# Patient Record
Sex: FEMALE | Race: OTHER | HISPANIC OR LATINO | Employment: UNEMPLOYED | URBAN - METROPOLITAN AREA
[De-identification: names, ages, dates, MRNs, and addresses within clinical notes are randomized per-mention and may not be internally consistent; named-entity substitution may affect disease eponyms.]

---

## 2018-10-05 ENCOUNTER — APPOINTMENT (EMERGENCY)
Dept: CT IMAGING | Facility: HOSPITAL | Age: 63
End: 2018-10-05
Payer: COMMERCIAL

## 2018-10-05 ENCOUNTER — HOSPITAL ENCOUNTER (EMERGENCY)
Facility: HOSPITAL | Age: 63
Discharge: HOME | End: 2018-10-06
Attending: EMERGENCY MEDICINE | Admitting: EMERGENCY MEDICINE
Payer: COMMERCIAL

## 2018-10-05 DIAGNOSIS — M54.9 MUSCULOSKELETAL BACK PAIN: ICD-10-CM

## 2018-10-05 DIAGNOSIS — R07.89 MUSCULOSKELETAL CHEST PAIN: ICD-10-CM
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
## 2018-10-05 PROCEDURE — 71260 CT THORAX DX C+: CPT

## 2018-10-05 PROCEDURE — 96361 HYDRATE IV INFUSION ADD-ON: CPT

## 2018-10-05 PROCEDURE — 85025 COMPLETE CBC W/AUTO DIFF WBC: CPT | Performed by: EMERGENCY MEDICINE

## 2018-10-05 PROCEDURE — 99284 EMERGENCY DEPT VISIT MOD MDM: CPT

## 2018-10-05 PROCEDURE — 80048 BASIC METABOLIC PNL TOTAL CA: CPT | Performed by: EMERGENCY MEDICINE

## 2018-10-05 PROCEDURE — 36415 COLL VENOUS BLD VENIPUNCTURE: CPT | Performed by: EMERGENCY MEDICINE

## 2018-10-05 PROCEDURE — 93005 ELECTROCARDIOGRAM TRACING: CPT

## 2018-10-05 RX ORDER — SODIUM CHLORIDE 9 MG/ML
125 INJECTION, SOLUTION INTRAVENOUS CONTINUOUS
Status: DISCONTINUED | OUTPATIENT
Start: 2018-10-05 | End: 2018-10-06 | Stop reason: HOSPADM

## 2018-10-05 RX ADMIN — SODIUM CHLORIDE 125 ML/HR: 9 INJECTION, SOLUTION INTRAVENOUS at 23:57

## 2018-10-06 VITALS
HEART RATE: 64 BPM | WEIGHT: 170.86 LBS | TEMPERATURE: 97 F | RESPIRATION RATE: 18 BRPM | HEIGHT: 62 IN | SYSTOLIC BLOOD PRESSURE: 115 MMHG | DIASTOLIC BLOOD PRESSURE: 82 MMHG | BODY MASS INDEX: 31.44 KG/M2 | OXYGEN SATURATION: 100 %

## 2018-10-06 LAB
ANION GAP SERPL CALCULATED.3IONS-SCNC: 10 MMOL/L (ref 5–14)
ATRIAL RATE: 85 BPM
BASOPHILS # BLD AUTO: 0.2 THOUSANDS/ΜL (ref 0–0.1)
BASOPHILS NFR BLD AUTO: 2 % (ref 0–1)
BUN SERPL-MCNC: 20 MG/DL (ref 5–25)
CALCIUM SERPL-MCNC: 9.7 MG/DL (ref 8.4–10.2)
CHLORIDE SERPL-SCNC: 102 MMOL/L (ref 97–108)
CO2 SERPL-SCNC: 27 MMOL/L (ref 22–30)
CREAT SERPL-MCNC: 0.98 MG/DL (ref 0.6–1.2)
EOSINOPHIL # BLD AUTO: 0 THOUSAND/ΜL (ref 0–0.4)
EOSINOPHIL NFR BLD AUTO: 0 % (ref 0–6)
ERYTHROCYTE [DISTWIDTH] IN BLOOD BY AUTOMATED COUNT: 13.5 %
GFR SERPL CREATININE-BSD FRML MDRD: 61 ML/MIN/1.73SQ M
GLUCOSE SERPL-MCNC: 131 MG/DL (ref 70–99)
HCT VFR BLD AUTO: 41.6 % (ref 36–46)
HGB BLD-MCNC: 14.3 G/DL (ref 12–16)
LYMPHOCYTES # BLD AUTO: 0.6 THOUSANDS/ΜL (ref 0.5–4)
LYMPHOCYTES NFR BLD AUTO: 7 % (ref 20–50)
MCH RBC QN AUTO: 31.5 PG (ref 26–34)
MCHC RBC AUTO-ENTMCNC: 34.4 G/DL (ref 31–36)
MCV RBC AUTO: 92 FL (ref 80–100)
MONOCYTES # BLD AUTO: 0.5 THOUSAND/ΜL (ref 0.2–0.9)
MONOCYTES NFR BLD AUTO: 6 % (ref 1–10)
NEUTROPHILS # BLD AUTO: 6.9 THOUSANDS/ΜL (ref 1.8–7.8)
NEUTS SEG NFR BLD AUTO: 85 % (ref 45–65)
P AXIS: 41 DEGREES
PLATELET # BLD AUTO: 149 THOUSANDS/UL (ref 150–450)
PMV BLD AUTO: 10.5 FL (ref 8.9–12.7)
POTASSIUM SERPL-SCNC: 4.1 MMOL/L (ref 3.6–5)
PR INTERVAL: 172 MS
QRS AXIS: -8 DEGREES
QRSD INTERVAL: 90 MS
QT INTERVAL: 362 MS
QTC INTERVAL: 430 MS
RBC # BLD AUTO: 4.54 MILLION/UL (ref 4–5.2)
SODIUM SERPL-SCNC: 139 MMOL/L (ref 137–147)
T WAVE AXIS: 15 DEGREES
VENTRICULAR RATE: 85 BPM
WBC # BLD AUTO: 8.1 THOUSAND/UL (ref 4.5–11)

## 2018-10-06 PROCEDURE — 96374 THER/PROPH/DIAG INJ IV PUSH: CPT

## 2018-10-06 PROCEDURE — 93010 ELECTROCARDIOGRAM REPORT: CPT | Performed by: INTERNAL MEDICINE

## 2018-10-06 PROCEDURE — 96361 HYDRATE IV INFUSION ADD-ON: CPT

## 2018-10-06 RX ORDER — CYCLOBENZAPRINE HCL 10 MG
10 TABLET ORAL 2 TIMES DAILY PRN
Qty: 20 TABLET | Refills: 0 | Status: SHIPPED | OUTPATIENT
Start: 2018-10-06

## 2018-10-06 RX ORDER — NAPROXEN 500 MG/1
500 TABLET ORAL 2 TIMES DAILY WITH MEALS
Qty: 30 TABLET | Refills: 0 | Status: SHIPPED | OUTPATIENT
Start: 2018-10-06

## 2018-10-06 RX ADMIN — IOHEXOL 85 ML: 350 INJECTION, SOLUTION INTRAVENOUS at 01:31

## 2018-10-06 RX ADMIN — MORPHINE SULFATE 2 MG: 2 INJECTION, SOLUTION INTRAMUSCULAR; INTRAVENOUS at 00:04
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
## 2018-10-06 NOTE — DISCHARGE INSTRUCTIONS
Accidente automovilístico   LO QUE NECESITA SABER:   Los accidentes automovilísticos pueden causar lesiones ocasionadas por el impacto o por john sido movido de un lado al otro dentro del salima  Podría tener un hematoma en el abdomen, pecho o kyra debido al cinturón de seguridad  También puede que tenga dolor en kaye jennifer, kyra o espalda  Podría sentir dolor en las rodillas, caderas o muslos si kaye cuerpo golpea el tablero o el volante  El dolor muscular tiende a empeorar de 1 a 2 días después del accidente  INSTRUCCIONES SOBRE [facility]:   [place] al 911 si presenta:   · Usted tiene un nuevo dolor de pecho o éste [facility], o tiene falta de [place]  Regrese a la giovani de emergencias si:   · Usted tiene un dolor nuevo o peor en el abdomen  · Usted tiene náuseas y vómitos que no mejoran  · Usted tiene un ernestine dolor de santiago  · Usted tiene debilidad, hormigueo o adormecimiento en bella brazos o piernas  · Usted tiene un dolor nuevo o peor que le dificulta el movimiento  Pregúntele a kaye Leamon Handsome vitaminas y minerales son adecuados para usted  · Usted tiene dolor que aparece de 2 a 3 días después del accidente  · Usted tiene preguntas o inquietudes acerca de kaye condición o cuidado  Medicamentos:   · Analgésicos:  Usted podría recibir medicamento para quitarle o reducir el dolor  No espere a que el dolor sea muy intenso para may el medicamento  · AINEs (Analgésicos antiinflamatorios no esteroides) ruben el ibuprofeno, ayudan a disminuir la inflamación, el dolor y la [place]  Juliana medicamento esta disponible con o sin erin receta médica  Los AINEs pueden causar sangrado estomacal o problemas renales en ciertas personas  Si usted esta tomando un anticoágulante,  siempre  pregunte si los AINEs son seguros para usted  Siempre olman la etiqueta de juliana medicamento y [place] instrucciones   No administre juliana medicamento a niños menores de 6 meses de maliha sin antes obtener la autorización de kaye médico      · [place] bella medicamentos ruben se le haya indicado  Consulte con kaye médico si usted ray que kaye medicamento no le está ayudando o si presenta efectos secundarios  Infórmele si es alérgico a algún medicamento  Mantenga erin lista actualizada de los [place], las vitaminas y los productos herbales que [place]  Incluya los siguientes datos de los medicamentos: cantidad, frecuencia y motivo de administración  Traiga con usted la lista o los envases de la píldoras a bella citas de seguimiento  Lleve la lista de los medicamentos con usted en johnny de erin emergencia  Acuda a bella consultas de control con kaye médico según le indicaron  Anote bella preguntas para que se acuerde de hacerlas franicsco bella visitas  Consejos de seguridad:   · Use siempre kaye cinturón de seguridad  [place] de kaye cinturón de seguridad ayudará a reducir las lesiones sufridas por accidentes automovilísticos  · Use asientos de seguridad para niños  Es necesario que kaye chente se siente en un asiento de seguridad para niños hecho para kaye edad, altura, y [place]  Pregúntele a kaye médico sobre [address] acerca de los asientos de seguridad para niños  · [place] velocidad  Maneje kaye salima al límite de velocidad para reducir kaye riesgo de accidentes automovilísticos  · No maneje si se siente cansado  Usted reacciona más lentamente cuando está cansado  El tiempo de reacción lento aumentará el riesgo de un accidente automovilístico      · No hable por teléfono ni envíe mensajes de texto [employer]  Usted no reaccionará lo suficientemente rápido en erin emergencia si se distrae con mensajes de texto o conversaciones  · No mitesh y [facility]  Use un chofer designado  Llame un taxi o pídale a alguien que lo lleve a casa si ha estado bebiendo alcohol  No permita que bella amigos manejen si whelan estado bebiendo alcohol  · No consuma drogas ilegales y [facility]    Es probable que se sienta más cansado o tome riesgos que usualmente no tomaría  No maneje después de may medicamentos prescritos que le dan sueño  Cuidados personales:   · Use hielo y calor  El hielo ayuda a disminuir la inflamación y el dolor  El hielo también puede contribuir a evitar el daño de los tejidos  Use un paquete de hielo o ponga hielo molido dentro de [employer]  Cúbrala con erin toalla y aplíquela al área adolorida por 15 a 20 minutos cada hora o ruben se le indique  Después de 2 días, use erin compresa caliente [employer]  Aplique calor ruben se lo recomiende el médico      · Estire bella músculos cuidadosamente  Melinda ejercicios suaves para estirar bella músculos después de john sufrido un accidente automovilístico  Consulte con fowler médico sobre cuáles ejercicios hacer  © 2017 [address]  Information is for End User's use only and may not be sold, redistributed or otherwise used for commercial purposes  All illustrations and images included in CareNotes® are the copyrighted property of A JENNIFER A M , Inc  or Chris Franklin  Esta información es sólo para uso en educación  Fowler intención no es darle un consejo médico sobre enfermedades o tratamientos  Colsulte con fowler Cindy Martin farmacéutico antes de seguir cualquier régimen médico para saber si es seguro y efectivo para usted

## 2018-10-06 NOTE — ED NOTES
Patient nauseated, given Zofran 4mg IVP per Dr Jaime Gearing order       Odette Arceo RN  10/06/18 9656

## 2019-02-09 NOTE — ED NOTES
Patient transported to CT via wheel chair with ED tech       Maru Canales RN  10/06/18 0783 Adequate: hears normal conversation without difficulty